# Patient Record
Sex: FEMALE | Race: BLACK OR AFRICAN AMERICAN | Employment: FULL TIME | URBAN - METROPOLITAN AREA
[De-identification: names, ages, dates, MRNs, and addresses within clinical notes are randomized per-mention and may not be internally consistent; named-entity substitution may affect disease eponyms.]

---

## 2019-08-18 ENCOUNTER — HOSPITAL ENCOUNTER (EMERGENCY)
Age: 44
Discharge: HOME | End: 2019-08-18
Attending: EMERGENCY MEDICINE | Admitting: EMERGENCY MEDICINE
Payer: COMMERCIAL

## 2019-08-18 ENCOUNTER — APPOINTMENT (OUTPATIENT)
Dept: GENERAL RADIOLOGY | Age: 44
End: 2019-08-18
Attending: PHYSICIAN ASSISTANT
Payer: COMMERCIAL

## 2019-08-18 VITALS
WEIGHT: 266.98 LBS | HEART RATE: 95 BPM | BODY MASS INDEX: 50.41 KG/M2 | TEMPERATURE: 98.4 F | RESPIRATION RATE: 15 BRPM | SYSTOLIC BLOOD PRESSURE: 146 MMHG | HEIGHT: 61 IN | OXYGEN SATURATION: 97 % | DIASTOLIC BLOOD PRESSURE: 97 MMHG

## 2019-08-18 DIAGNOSIS — S83.91XA SPRAIN OF RIGHT KNEE, UNSPECIFIED LIGAMENT, INITIAL ENCOUNTER: Primary | ICD-10-CM

## 2019-08-18 PROCEDURE — 96372 THER/PROPH/DIAG INJ SC/IM: CPT

## 2019-08-18 PROCEDURE — 74011250636 HC RX REV CODE- 250/636: Performed by: PHYSICIAN ASSISTANT

## 2019-08-18 PROCEDURE — 99282 EMERGENCY DEPT VISIT SF MDM: CPT

## 2019-08-18 PROCEDURE — 73562 X-RAY EXAM OF KNEE 3: CPT

## 2019-08-18 RX ORDER — METFORMIN HYDROCHLORIDE 500 MG/1
500 TABLET ORAL
COMMUNITY

## 2019-08-18 RX ORDER — KETOROLAC TROMETHAMINE 30 MG/ML
30 INJECTION, SOLUTION INTRAMUSCULAR; INTRAVENOUS
Status: COMPLETED | OUTPATIENT
Start: 2019-08-18 | End: 2019-08-18

## 2019-08-18 RX ORDER — KETOROLAC TROMETHAMINE 10 MG/1
10 TABLET, FILM COATED ORAL
Qty: 20 TAB | Refills: 0 | Status: SHIPPED | OUTPATIENT
Start: 2019-08-18 | End: 2020-11-28

## 2019-08-18 RX ADMIN — KETOROLAC TROMETHAMINE 30 MG: 30 INJECTION, SOLUTION INTRAMUSCULAR at 21:34

## 2019-08-19 NOTE — ED PROVIDER NOTES
EMERGENCY DEPARTMENT HISTORY AND PHYSICAL EXAM      Date: 8/18/2019  Patient Name: Héctor Costa    History of Presenting Illness     Chief Complaint   Patient presents with    Knee Pain     Right knee pain after a fall playing basketball. Able to bear weight       History Provided By: Patient    HPI: Héctor Costa, 40 y.o. female with PMHx significant for prediabetes, obesity, presents to the ED with cc of right knee injury this evening. She was playing basketball when she jumped into the air and another player hit her, causing her to twist her right knee as she landed. Since then, she has had pain to the lateral and posterior knee. Pain is worse with bending and weightbearing but she has been able to bear weight. She took Tylenol prior to coming in without relief of pain. She denies other injury. There are no other complaints, changes, or physical findings at this time. PCP: Kvng Boland MD    No current facility-administered medications on file prior to encounter. Current Outpatient Medications on File Prior to Encounter   Medication Sig Dispense Refill    metFORMIN (GLUCOPHAGE) 500 mg tablet Take 500 mg by mouth two (2) times daily (with meals). Past History     Past Medical History:  Past Medical History:   Diagnosis Date    Diabetes ([address]     pre-diabetic    Obesity        Past Surgical History:  Past Surgical History:   Procedure Laterality Date    HX GYN      fibroid removed about 9 yrs.  ago       Family History:  Family History   Problem Relation Age of Onset    Hypertension Mother     Lung Disease Father         copd    Hypertension Father     Anesth Problems Neg Hx        Social History:  Social History     Tobacco Use    Smoking status: Never Smoker    Smokeless tobacco: Never Used   Substance Use Topics    Alcohol use: Yes     Comment: occasionally    Drug use: No       Allergies:  No Known Allergies      Review of Systems   Review of Systems   Constitutional: Negative for chills and fever. HENT: Negative for ear pain and sore throat. Eyes: Negative for redness and visual disturbance. Respiratory: Negative for cough and shortness of breath. Cardiovascular: Negative for chest pain and palpitations. Gastrointestinal: Negative for abdominal pain, nausea and vomiting. Genitourinary: Negative for dysuria and hematuria. Musculoskeletal: Negative for back pain and gait problem. +right knee pain   Skin: Negative for rash and wound. Neurological: Negative for dizziness and headaches. Psychiatric/Behavioral: Negative for behavioral problems and confusion. All other systems reviewed and are negative. Physical Exam   Physical Exam   Constitutional: She is oriented to person, place, and time. She appears well-developed and well-nourished. Interactive female in no apparent distress. HENT:   Head: Normocephalic and atraumatic. Eyes: Pupils are equal, round, and reactive to light. Conjunctivae and EOM are normal.   Neck: Normal range of motion. Neck supple. Cardiovascular: Normal rate, regular rhythm and normal heart sounds. Pulmonary/Chest: Effort normal and breath sounds normal.   Musculoskeletal: Normal range of motion. Right knee: She exhibits normal range of motion, no swelling, no effusion, normal alignment, no LCL laxity and no MCL laxity. Tenderness (posterior) found. Lateral joint line tenderness noted. Neurological: She is alert and oriented to person, place, and time. She has normal strength. No cranial nerve deficit or sensory deficit. GCS eye subscore is 4. GCS verbal subscore is 5. GCS motor subscore is 6. Skin: Skin is warm and dry. No rash noted. Psychiatric: She has a normal mood and affect. Her behavior is normal.   Nursing note and vitals reviewed. Diagnostic Study Results     Labs -   No results found for this or any previous visit (from the past 12 hour(s)).     Radiologic Studies -   XR KNEE RT 3 V Final Result   IMPRESSION: No acute abnormality. CT Results  (Last 48 hours)    None        CXR Results  (Last 48 hours)    None            Medical Decision Making   I am the first provider for this patient. I reviewed the vital signs, available nursing notes, past medical history, past surgical history, family history and social history. Vital Signs-Reviewed the patient's vital signs. Patient Vitals for the past 12 hrs:   Temp Pulse Resp BP SpO2   08/18/19 2033 98.4 °F (36.9 °C) 95 15 (!) 146/97 97 %         Records Reviewed: Nursing Notes and Old Medical Records    Provider Notes (Medical Decision Making):   Pt presents with right knee injury. DDx includes fracture, dislocation, sprain, contusion. ED Course:   Initial assessment performed. The patients presenting problems have been discussed, and they are in agreement with the care plan formulated and outlined with them. I have encouraged them to ask questions as they arise throughout their visit. Disposition:  10:11 PM    The patient has been re-evaluated and is ready for discharge. Reviewed available results with patient. Counseled patient on diagnosis and care plan. Patient has expressed understanding, and all questions have been answered. Patient agrees with plan and agrees to follow up as recommended, or to return to the ED if their symptoms worsen. Discharge instructions have been provided and explained to the patient, along with reasons to return to the ED. PLAN:  1. Discharge Medication List as of 8/18/2019 10:12 PM      START taking these medications    Details   ketorolac (TORADOL) 10 mg tablet Take 1 Tab by mouth every six (6) hours as needed for Pain., Normal, Disp-20 Tab, R-0         CONTINUE these medications which have NOT CHANGED    Details   metFORMIN (GLUCOPHAGE) 500 mg tablet Take 500 mg by mouth two (2) times daily (with meals). , Historical Med           2.    Follow-up Information     Follow up With Specialties Details Why Contact Info    [facility]  Call in 1 day to schedule a follow up appointment [address] [place] [address]  [address] [facility],[address]  [address]   [address] [address]  [phone]    [facility] EMERGENCY DEPT Emergency Medicine Go to If symptoms worsen [address] [place] [address]  [phone]        Return to ED if worse     Diagnosis     Clinical Impression:   1. Sprain of right knee, unspecified ligament, initial encounter            Nancy Sharp. CHAVA Bosch        This note will not be viewable in [employer].

## 2019-08-19 NOTE — DISCHARGE INSTRUCTIONS
Patient Education        Knee Sprain: Care Instructions  Your Care Instructions    A knee sprain is one or more stretched, partly torn, or completely torn knee ligaments. Ligaments are bands of ropelike tissue that connect bone to bone and make the knee stable. The knee has four main ligaments. Knee sprains often happen because of a twisting or bending injury from sports such as skiing, basketball, soccer, or football. The knee turns one way while the lower or upper leg goes another way. A sprain also can happen when the knee is hit from the side or the front. If a knee ligament is slightly stretched, you will probably need only home treatment. You may need a splint or brace (immobilizer) for a partly torn ligament. A complete tear may need surgery. A minor knee sprain may take up to 6 weeks to heal, while a severe sprain may take months. Follow-up care is a key part of your treatment and safety. Be sure to make and go to all appointments, and call your doctor if you are having problems. It's also a good idea to know your test results and keep a list of the medicines you take. How can you care for yourself at home? · Follow instructions about how much weight you can put on your leg and how to walk with crutches. · Prop up your leg on a pillow when you ice it or anytime you sit or lie down for the next 3 days. Try to keep it above the level of your heart. This will help reduce swelling. · Put ice or a cold pack on your knee for 10 to 20 minutes at a time. Try to do this every 1 to 2 hours for the next 3 days (when you are awake) or until the swelling goes down. Put a thin cloth between the ice and your skin. Do not get the splint wet. · If you have an elastic bandage, make sure it is snug but not so tight that your leg is numb, tingles, or swells below the bandage. You can loosen the bandage if it is too tight. · Your doctor may recommend a brace (immobilizer) to support your knee while it heals.  Wear it as directed. · Ask your doctor if you can take an over-the-counter pain medicine, such as acetaminophen (Tylenol), ibuprofen (Advil, Motrin), or naproxen (Aleve). Be safe with medicines. Read and follow all instructions on the label. When should you call for help? Call 911 anytime you think you may need emergency care. For example, call if:    · You have sudden chest pain and shortness of breath, or you cough up blood.    Call your doctor now or seek immediate medical care if:    · You have increased or severe pain.     · You cannot move your toes or ankle.     · Your foot is cool or pale or changes color.     · You have tingling, weakness, or numbness in your foot or leg.     · Your splint or brace feels too tight.     · You are unable to straighten the knee, or the knee \"locks. \"     · You have signs of a blood clot in your leg, such as:  ? Pain in your calf, back of the knee, thigh, or groin. ? Redness and swelling in your leg.    Watch closely for changes in your health, and be sure to contact your doctor if:    · Your pain is not getting better or is getting worse. Where can you learn more? Go to http://anna-kaye.info/. Enter N406 in the search box to learn more about \"Knee Sprain: Care Instructions. \"  Current as of: September 20, 2018  Content Version: 12.1  © 6931-5558 [employer], [employer]. Care instructions adapted under license by [employer] (which disclaims liability or warranty for this information). If you have questions about a medical condition or this instruction, always ask your healthcare professional. [address] any warranty or liability for your use of this information.

## 2019-09-04 ENCOUNTER — HOSPITAL ENCOUNTER (OUTPATIENT)
Dept: MRI IMAGING | Age: 44
Discharge: HOME | End: 2019-09-04
Attending: ORTHOPAEDIC SURGERY
Payer: COMMERCIAL

## 2019-09-04 DIAGNOSIS — S83.241A ACUTE MEDIAL MENISCUS TEAR, RIGHT, INITIAL ENCOUNTER: ICD-10-CM

## 2019-09-04 PROCEDURE — 73721 MRI JNT OF LWR EXTRE W/O DYE: CPT

## 2020-11-28 ENCOUNTER — OFFICE VISIT (OUTPATIENT)
Dept: URGENT CARE | Age: 45
End: 2020-11-28
Payer: COMMERCIAL

## 2020-11-28 VITALS — HEART RATE: 68 BPM | TEMPERATURE: 98.4 F | RESPIRATION RATE: 16 BRPM | OXYGEN SATURATION: 97 %

## 2020-11-28 DIAGNOSIS — Z20.822 ENCOUNTER FOR LABORATORY TESTING FOR COVID-19 VIRUS: Primary | ICD-10-CM

## 2020-11-28 PROCEDURE — 99202 OFFICE O/P NEW SF 15 MIN: CPT | Performed by: NURSE PRACTITIONER

## 2020-11-28 NOTE — PROGRESS NOTES
Patient presents with a request for COVID Testing. She was exposed to a COVID positive person. She is asymptomatic. This patient was seen in [facility] at [address] [place] [address] Urgent Care while in their vehicle due to COVID-19 pandemic with PPE and focused examination in order to decrease community viral transmission. The patient/guardian gave verbal consent to treat. The history is provided by the patient. Past Medical History:   Diagnosis Date    Diabetes ([facility] [address]     pre-diabetic    Obesity         Past Surgical History:   Procedure Laterality Date    HX GYN      fibroid removed about 9 yrs.  ago         Family History   Problem Relation Age of Onset    Hypertension Mother     Lung Disease Father         copd    Hypertension Father     Anesth Problems Neg Hx         Social History     Socioeconomic History    Marital status:      Spouse name: Not on file    Number of children: Not on file    Years of education: Not on file    Highest education level: Not on file   Occupational History    Not on file   Social Needs    Financial resource strain: Not on file    Food insecurity     Worry: Not on file     Inability: Not on file    Transportation needs     Medical: Not on file     Non-medical: Not on file   Tobacco Use    Smoking status: Never Smoker    Smokeless tobacco: Never Used   Substance and Sexual Activity    Alcohol use: Yes     Comment: occasionally    Drug use: No    Sexual activity: Yes     Partners: Male   Lifestyle    Physical activity     Days per week: Not on file     Minutes per session: Not on file    Stress: Not on file   Relationships    Social connections     Talks on phone: Not on file     Gets together: Not on file     Attends Rastafarian service: Not on file     Active member of club or organization: Not on file     Attends meetings of clubs or organizations: Not on file     Relationship status: Not on file    Intimate partner violence     Fear of current or ex partner: Not on file     Emotionally abused: Not on file     Physically abused: Not on file     Forced sexual activity: Not on file   Other Topics Concern    Not on file   Social History Narrative    Not on file                ALLERGIES: Patient has no known allergies. Review of Systems   Constitutional: Negative for activity change, appetite change, chills, fatigue and fever. HENT: Negative for congestion, rhinorrhea, sinus pressure and sinus pain. Respiratory: Negative for cough, chest tightness and shortness of breath. Cardiovascular: Negative for chest pain. Gastrointestinal: Negative for diarrhea, nausea and vomiting. Vitals:    11/28/20 1703   Pulse: 68   Resp: 16   Temp: 98.4 °F (36.9 °C)   SpO2: 97%       Physical Exam  Constitutional:       General: She is not in acute distress. Appearance: She is well-developed. She is not ill-appearing. HENT:      Head: Normocephalic. Right Ear: No drainage. Tympanic membrane is not erythematous or bulging. Left Ear: No drainage. Tympanic membrane is not erythematous or bulging. Nose: Nose normal. No congestion or rhinorrhea. Cardiovascular:      Rate and Rhythm: Normal rate. Pulmonary:      Effort: Pulmonary effort is normal. No respiratory distress. Breath sounds: No decreased breath sounds. Neurological:      Mental Status: She is alert. MDM    ICD-10-CM ICD-9-CM    1. Encounter for laboratory testing for COVID-19 virus  Z20.828 V01.79 NOVEL CORONAVIRUS (COVID-19)     No orders of the defined types were placed in this encounter. No results found for any visits on 11/28/20. The patients condition was discussed with the patient and they understand. The patient is to follow up with primary care doctor. If signs and symptoms become worse the pt is to go to the ER. The patient is to take medications as prescribed.      Procedures

## 2020-12-02 LAB — SARS-COV-2, NAA: NOT DETECTED

## 2022-05-16 ENCOUNTER — OFFICE VISIT (OUTPATIENT)
Dept: ORTHOPEDIC SURGERY | Age: 47
End: 2022-05-16
Payer: COMMERCIAL

## 2022-05-16 VITALS — HEIGHT: 61 IN | BODY MASS INDEX: 51.92 KG/M2 | WEIGHT: 275 LBS

## 2022-05-16 DIAGNOSIS — M17.12 PRIMARY OSTEOARTHRITIS OF LEFT KNEE: Primary | ICD-10-CM

## 2022-05-16 PROCEDURE — 99214 OFFICE O/P EST MOD 30 MIN: CPT

## 2022-05-16 PROCEDURE — 20610 DRAIN/INJ JOINT/BURSA W/O US: CPT

## 2022-05-16 RX ORDER — DICLOFENAC SODIUM 75 MG/1
75 TABLET, DELAYED RELEASE ORAL 2 TIMES DAILY
Qty: 60 TABLET | Refills: 1 | Status: SHIPPED | OUTPATIENT
Start: 2022-05-16

## 2022-05-16 RX ORDER — TRIAMCINOLONE ACETONIDE 40 MG/ML
40 INJECTION, SUSPENSION INTRA-ARTICULAR; INTRAMUSCULAR ONCE
Status: COMPLETED | OUTPATIENT
Start: 2022-05-16 | End: 2022-05-16

## 2022-05-16 RX ORDER — BUPIVACAINE HYDROCHLORIDE 5 MG/ML
5 INJECTION, SOLUTION PERINEURAL ONCE
Status: COMPLETED | OUTPATIENT
Start: 2022-05-16 | End: 2022-05-16

## 2022-05-16 RX ADMIN — TRIAMCINOLONE ACETONIDE 40 MG: 40 INJECTION, SUSPENSION INTRA-ARTICULAR; INTRAMUSCULAR at 09:20

## 2022-05-16 RX ADMIN — BUPIVACAINE HYDROCHLORIDE 25 MG: 5 INJECTION, SOLUTION PERINEURAL at 09:20

## 2022-05-16 NOTE — PROGRESS NOTES
Ebenezer Figueroa (: 1975) is a 52 y.o. female, patient, here for evaluation of the following chief complaint(s):  Knee Pain (left)       SUBJECTIVE/OBJECTIVE:  Ebenezer Figueroa presents today complaining of worsening left knee pain. To review, we saw her over a year ago. Prior conservative treatment of left knee osteoarthritis, worsening recently. She has an upcoming trip. Pain located medially without radiation. Worse with increased activity. No new injury or trauma. PHYSICAL EXAM:  Vitals: Ht 5' 1\" (1.549 m)   Wt 275 lb (124.7 kg)   LMP 2015   BMI 51.96 kg/m²   Body mass index is 51.96 kg/m². 52y.o. year old F in no acute distress. Morbidly obese, but otherwise appears well. Negative Stinchfield on the left. Varus alignment of the left knee. Tenderness to palpation along the medial joint line. Range of motion 0 110/115 degrees. No instability. Motor 5/5. No distal edema. IMAGING:  Radiographs: XR Results (most recent):  Results from Appointment encounter on 22    XR KNEE LT MIN 4 V    Narrative  Four views (AP, PA-flex, lateral & sunrise) of the left knee were taken and reviewed today using digital radiography which reveal complete loss of medial joint space. Severe patellofemoral osteoarthritis. ASSESSMENT/PLAN:  1. Primary osteoarthritis of left knee  -     XR KNEE LT MIN 4 V; Future  -     diclofenac EC (VOLTAREN) 75 mg EC tablet; Take 1 Tablet by mouth two (2) times a day., Normal, Disp-60 Tablet, R-1  -     bupivacaine HCl (MARCAINE) 0.5 % (5 mg/mL) injection 25 mg; 25 mg (5 mL), Other, ONCE, 1 dose, On Mon 22 at 1000  -     triamcinolone acetonide (KENALOG-40) 40 mg/mL injection 40 mg; 40 mg, Intra artICUlar, ONCE, 1 dose, On 22 at 1000  2. BMI 50.0-59.9, [facility])    The xray and exam findings were discussed with the patient today. Acute exacerbation of severe left knee osteoarthritis.   Discussed risks/benefits of conservative vs. operative interventions at this time to include NSAIDs, PT, cortisone injections, and surgery. She takes Aleve with moderate relief, but for complete relief we will try diclofenac 75 mg twice a day as needed. I sent this to her pharmacy. In addition, she elects to proceed with cortisone injection today in preparation for upcoming trip to [place]. Discussed natural disease progression and likely need for total knee arthroplasty. However, treatment options are limited by BMI. She understands she has to lose weight prior to becoming an optimize surgical candidate. Follow-up as needed. Discussed risks/benefits of cortisone injection and patient gave verbal consent. Under sterile conditions, the left knee was injected with 5cc 0.5% Bupivacaine and 1cc 40mg Triamcinolone Acetonide (Kenalog) intra-articularly, tolerated the procedure well. Return if symptoms worsen or fail to improve. Review Of Systems  ROS     Positive for: Musculoskeletal    Last edited by Aleyda Springer RN on 5/16/2022  8:53 AM. (History)         Patient denies any recent fever, chills, nausea, vomiting, chest pain, or shortness of breath. No Known Allergies    Current Outpatient Medications   Medication Sig    diclofenac EC (VOLTAREN) 75 mg EC tablet Take 1 Tablet by mouth two (2) times a day.  metFORMIN (GLUCOPHAGE) 500 mg tablet Take 500 mg by mouth three (3) times daily (with meals). No current facility-administered medications for this visit. Past Medical History:   Diagnosis Date    Diabetes ([address]     pre-diabetic    Obesity         Past Surgical History:   Procedure Laterality Date    HX GYN      fibroid removed about 9 yrs.  ago       Family History   Problem Relation Age of Onset    Hypertension Mother     Lung Disease Father         copd    Hypertension Father     Anesth Problems Neg Hx         Social History     Socioeconomic History    Marital status:      Spouse name: Not on file    Number of children: Not on file    Years of education: Not on file    Highest education level: Not on file   Occupational History    Not on file   Tobacco Use    Smoking status: Never Smoker    Smokeless tobacco: Never Used   Substance and Sexual Activity    Alcohol use: Yes     Comment: occasionally    Drug use: No    Sexual activity: Yes     Partners: Male   Other Topics Concern    Not on file   Social History Narrative    Not on file     Social Determinants of Health     Financial Resource Strain:     Difficulty of Paying Living Expenses: Not on file   Food Insecurity:     Worried About Running Out of Food in the Last Year: Not on file    Justo of Food in the Last Year: Not on file   Transportation Needs:     Lack of Transportation (Medical): Not on file    Lack of Transportation (Non-Medical): Not on file   Physical Activity:     Days of Exercise per Week: Not on file    Minutes of Exercise per Session: Not on file   Stress:     Feeling of Stress : Not on file   Social Connections:     Frequency of Communication with Friends and Family: Not on file    Frequency of Social Gatherings with Friends and Family: Not on file    Attends Sikhism Services: Not on file    Active Member of [address] [place] [address] or Organizations: Not on file    Attends Club or Organization Meetings: Not on file    Marital Status: Not on file   Intimate Partner Violence:     Fear of Current or Ex-Partner: Not on file    Emotionally Abused: Not on file    Physically Abused: Not on file    Sexually Abused: Not on file   Housing Stability:     Unable to Pay for Housing in the Last Year: Not on file    Number of [place] in the Last Year: Not on file    Unstable Housing in the Last Year: Not on file       Orders Placed This Encounter    XR KNEE LT MIN 4 V     Standing Status:   Future     Number of Occurrences:   1     Standing Expiration Date:   5/17/2023     Order Specific Question:   Is Patient Pregnant?      Answer: No    diclofenac EC (VOLTAREN) 75 mg EC tablet     Sig: Take 1 Tablet by mouth two (2) times a day. Dispense:  60 Tablet     Refill:  1    bupivacaine HCl (MARCAINE) 0.5 % (5 mg/mL) injection 25 mg    triamcinolone acetonide (KENALOG-40) 40 mg/mL injection 40 mg      Daniel Muñiz M.D. was available for immediate consultation as the supervising physician. An electronic signature was used to authenticate this note.   -- Sandra Coburn PA-C

## 2022-05-16 NOTE — LETTER
5/16/2022    Patient: Arminda Suárez   YOB: 1975   Date of Visit: 5/16/2022     Wendy Dougherty MD  [facility] [phone]  Via Fax: [phone]    Dear Wendy Dougherty MD,      Thank you for referring Ms. Arminda Suárez to [facility] for evaluation. My notes for this consultation are attached. If you have questions, please do not hesitate to call me. I look forward to following your patient along with you.       Sincerely,    Alyson Emmanuel PA-C

## 2023-02-27 ENCOUNTER — OFFICE VISIT (OUTPATIENT)
Dept: ORTHOPEDIC SURGERY | Age: 48
End: 2023-02-27
Payer: COMMERCIAL

## 2023-02-27 VITALS — WEIGHT: 270 LBS | HEIGHT: 61 IN | BODY MASS INDEX: 50.98 KG/M2

## 2023-02-27 DIAGNOSIS — M17.12 PRIMARY OSTEOARTHRITIS OF LEFT KNEE: Primary | ICD-10-CM

## 2023-02-27 RX ORDER — BUPIVACAINE HYDROCHLORIDE 5 MG/ML
5 INJECTION, SOLUTION PERINEURAL ONCE
Status: COMPLETED | OUTPATIENT
Start: 2023-02-27 | End: 2023-02-27

## 2023-02-27 RX ORDER — TRIAMCINOLONE ACETONIDE 40 MG/ML
40 INJECTION, SUSPENSION INTRA-ARTICULAR; INTRAMUSCULAR ONCE
Status: COMPLETED | OUTPATIENT
Start: 2023-02-27 | End: 2023-02-27

## 2023-02-27 RX ADMIN — TRIAMCINOLONE ACETONIDE 40 MG: 40 INJECTION, SUSPENSION INTRA-ARTICULAR; INTRAMUSCULAR at 08:43

## 2023-02-27 RX ADMIN — BUPIVACAINE HYDROCHLORIDE 25 MG: 5 INJECTION, SOLUTION PERINEURAL at 08:42

## 2023-02-27 NOTE — PROGRESS NOTES
Chandler Banda (: 1975) is a 50 y.o. female, patient, here for evaluation of the following chief complaint(s):  Knee Pain (Left )       SUBJECTIVE/OBJECTIVE:  Chandler Banda presents today complaining of left knee pain. Knee pain began in October when she started working out, doing small group fitness classes at [employer]. Pain is diffuse in the knee, primarily medial without radiation. PHYSICAL EXAM:  Vitals: Ht 5' 1\" (1.549 m)   Wt 270 lb (122.5 kg)   LMP 2015   BMI 51.02 kg/m²   Body mass index is 51.02 kg/m². 50y.o. year old F in no acute distress. Morbidly obese. Varus alignment of the left knee, medial and lateral joint line tenderness palpation. Range of motion 0-1 05/110. Motor 5/5. No distal edema. IMAGING:  Radiographs: Prior x-rays reviewed, complete loss of medial joint space. Severe patellofemoral osteoarthritis. ASSESSMENT/PLAN:  1. Primary osteoarthritis of left knee  -     bupivacaine HCl (MARCAINE) 0.5 % (5 mg/mL) injection 25 mg; 25 mg (5 mL), Other, ONCE, 1 dose, On 23 at 0900  -     triamcinolone acetonide (KENALOG-40) 40 mg/mL injection 40 mg; 40 mg, Intra artICUlar, ONCE, 1 dose, On 23 at 0900  2. BMI 50.0-59.9, adult [facility])    The xray and exam findings were discussed with the patient today. Acute exacerbation of severe left knee osteoarthritis in the setting of morbid obesity. Her treatment options are limited until she loses weight. She has been working out at [employer] and attempt to lose weight, hoping to do a knee replacement sooner rather than later. In the interim, she elects to do a cortisone injection today. Over-the-counter anti-inflammatories and/or Tylenol as needed for pain relief. Continue low impact activity as tolerated. Follow-up as needed. Discussed risks/benefits of cortisone injection and patient gave verbal consent.   Under sterile conditions, the left knee was injected with 5cc 0.5% Bupivacaine and 1cc 40mg Triamcinolone Acetonide (Kenalog) intra-articularly, tolerated the procedure well. Return if symptoms worsen or fail to improve. Review Of Systems  ROS    Positive for: Musculoskeletal  Last edited by Eryn Fulton on 2/27/2023  8:40 AM.         Patient denies any recent fever, chills, nausea, vomiting, chest pain, or shortness of breath. No Known Allergies    Current Outpatient Medications   Medication Sig    metFORMIN (GLUCOPHAGE) 500 mg tablet Take 500 mg by mouth three (3) times daily (with meals). diclofenac EC (VOLTAREN) 75 mg EC tablet Take 1 Tablet by mouth two (2) times a day. (Patient not taking: Reported on 2/27/2023)     No current facility-administered medications for this visit. Past Medical History:   Diagnosis Date    Diabetes ([facility] [address]     pre-diabetic    Obesity         Past Surgical History:   Procedure Laterality Date    HX GYN      fibroid removed about 9 yrs.  ago       Family History   Problem Relation Age of Onset    Hypertension Mother     Lung Disease Father         copd    Hypertension Father     Anesth Problems Neg Hx         Social History     Socioeconomic History    Marital status:      Spouse name: Not on file    Number of children: Not on file    Years of education: Not on file    Highest education level: Not on file   Occupational History    Not on file   Tobacco Use    Smoking status: Never    Smokeless tobacco: Never   Substance and Sexual Activity    Alcohol use: Yes     Comment: occasionally    Drug use: No    Sexual activity: Yes     Partners: Male   Other Topics Concern    Not on file   Social History Narrative    Not on file     Social Determinants of Health     Financial Resource Strain: Not on file   Food Insecurity: Not on file   Transportation Needs: Not on file   Physical Activity: Not on file   Stress: Not on file   Social Connections: Not on file   Intimate Partner Violence: Not on file   Housing Stability: Not on file       Orders Placed This Encounter    bupivacaine HCl (MARCAINE) 0.5 % (5 mg/mL) injection 25 mg    triamcinolone acetonide (KENALOG-40) 40 mg/mL injection 40 mg      Daniel Cheung M.D. was available for immediate consultation as the supervising physician. An electronic signature was used to authenticate this note.   -- Lucina Salazar PA-C

## 2023-02-27 NOTE — LETTER
2/27/2023    Patient: Erin Stone   YOB: 1975   Date of Visit: 2/27/2023     Claribel Ardon, [facility] [facility],[address], [place] [id number]  Via Fax: [phone]    Dear Claribel Ardon MD,      Thank you for referring Ms. Erin Stone to [facility] for evaluation. My notes for this consultation are attached. If you have questions, please do not hesitate to call me. I look forward to following your patient along with you.       Sincerely,    Lawanda Delgadillo PA-C

## 2025-06-17 PROBLEM — M17.12 OSTEOARTHRITIS OF LEFT KNEE: Status: ACTIVE | Noted: 2025-06-17

## 2025-06-26 ENCOUNTER — HOSPITAL ENCOUNTER (OUTPATIENT)
Facility: HOSPITAL | Age: 50
Discharge: HOME | End: 2025-06-29
Payer: COMMERCIAL

## 2025-06-26 VITALS
SYSTOLIC BLOOD PRESSURE: 122 MMHG | HEIGHT: 61 IN | TEMPERATURE: 97.7 F | RESPIRATION RATE: 16 BRPM | HEART RATE: 59 BPM | WEIGHT: 247.2 LBS | DIASTOLIC BLOOD PRESSURE: 84 MMHG | BODY MASS INDEX: 46.67 KG/M2

## 2025-06-26 LAB
ABO + RH BLD: NORMAL
APPEARANCE UR: CLEAR
BACTERIA URNS QL MICRO: NEGATIVE /HPF
BILIRUB UR QL: NEGATIVE
BLOOD GROUP ANTIBODIES SERPL: NORMAL
COLOR UR: ABNORMAL
EKG ATRIAL RATE: 56 BPM
EKG DIAGNOSIS: NORMAL
EKG P AXIS: 29 DEGREES
EKG P-R INTERVAL: 152 MS
EKG Q-T INTERVAL: 408 MS
EKG QRS DURATION: 88 MS
EKG QTC CALCULATION (BAZETT): 393 MS
EKG R AXIS: 26 DEGREES
EKG T AXIS: 2 DEGREES
EKG VENTRICULAR RATE: 56 BPM
EPITH CASTS URNS QL MICRO: ABNORMAL /LPF
GLUCOSE UR STRIP.AUTO-MCNC: NEGATIVE MG/DL
HGB UR QL STRIP: NEGATIVE
HYALINE CASTS URNS QL MICRO: ABNORMAL /LPF (ref 0–5)
INR PPP: 1 (ref 0.9–1.1)
KETONES UR QL STRIP.AUTO: NEGATIVE MG/DL
LEUKOCYTE ESTERASE UR QL STRIP.AUTO: ABNORMAL
NITRITE UR QL STRIP.AUTO: NEGATIVE
PH UR STRIP: 5 (ref 5–8)
PROT UR STRIP-MCNC: NEGATIVE MG/DL
PROTHROMBIN TIME: 10.3 SEC (ref 9.2–11.2)
RBC #/AREA URNS HPF: ABNORMAL /HPF (ref 0–5)
SP GR UR REFRACTOMETRY: 1.01 (ref 1–1.03)
SPECIMEN EXP DATE BLD: NORMAL
URINE CULTURE IF INDICATED: ABNORMAL
UROBILINOGEN UR QL STRIP.AUTO: 0.2 EU/DL (ref 0.2–1)
WBC URNS QL MICRO: ABNORMAL /HPF (ref 0–4)

## 2025-06-26 PROCEDURE — 93010 ELECTROCARDIOGRAM REPORT: CPT | Performed by: SPECIALIST

## 2025-06-26 PROCEDURE — 86901 BLOOD TYPING SEROLOGIC RH(D): CPT

## 2025-06-26 PROCEDURE — 85610 PROTHROMBIN TIME: CPT

## 2025-06-26 PROCEDURE — 93005 ELECTROCARDIOGRAM TRACING: CPT | Performed by: ORTHOPAEDIC SURGERY

## 2025-06-26 PROCEDURE — 81001 URINALYSIS AUTO W/SCOPE: CPT

## 2025-06-26 PROCEDURE — 86850 RBC ANTIBODY SCREEN: CPT

## 2025-06-26 PROCEDURE — 86900 BLOOD TYPING SEROLOGIC ABO: CPT

## 2025-06-26 NOTE — PERIOP NOTE
[address] [address]     MAIN PRE OP            [phone]                                                                                AMBULATORY PRE OP          [phone]    PRE-ADMISSION TESTING    [phone]     Surgery Date:  7/17/25       [facility] staff will call you between 4 and 7pm the day before your surgery with your arrival time. (If your surgery is on a Monday, we will call you the Friday before.)    Call [phone] after 7pm Monday-Friday if you did not receive this call.    INSTRUCTIONS BEFORE YOUR SURGERY   When You  Arrive Arrive at [facility] Patient Access on 1st floor the day of your surgery.  Have your insurance card, photo ID,living will/advanced directive/POA (if applicable),  and any copayment (if needed)   Food   and   Drink NO solid food after midnight the night before surgery. You can drink clear liquids from midnight until ONE hour prior to your arrival at the hospital on the day of your surgery.     Clear liquids include:  Water  Apple juice (no sediment)  Carbonated beverages  Black coffee(no cream/milk)  Tea(no cream/milk)  Gatorade    No alcohol (beer, wine, liquor) or marijuana (smoking) 24 hours prior to surgery.   No marijuana edibles for 3 days prior to surgery.    Stop smoking cigarettes 14 days before surgery (helps w/healing and breathing).   Medications to   TAKE   Morning of Surgery MEDICATIONS TO TAKE THE MORNING OF SURGERY WITH A SIP OF WATER:   NONE    You may take these medications, IF NEEDED, the morning of surgery: N/A    Ask your surgeon/prescribing doctor for instructions on taking or stopping these medications prior to surgery: N/A   Medications to STOP  before surgery Non-Steroidal anti-inflammatory Drugs (NSAID's): for example, Diclofenac(Voltaren),  Ibuprofen (Advil, Motrin), Naproxen (Aleve) 3 days    STOP all herbal supplements and vitamins(unless prescribed by your doctor), and  diabetes, it is important for you to manage your blood sugar levels properly before your surgery as well as after your surgery. Poorly managed blood sugar levels slow down wound healing and prevent you from healing completely.        Testing for Staphylococcus aureus on your skin before surgery    Staphylococcus aureus (staph) is a common bacteria that is found on the body. It normally does not cause infection on healthy skin. Before surgery, you will be tested to see if you have staph by swabbing the inside of your nose. When you have an incision with surgery, the goal is to protect that incision from infection. Removal of the staph bacteria before surgery can decrease the risk of a surgical site infection.    If your nose swab is positive for staph you will be called. Your treatment will include 2 steps:  Prescription for Mupirocin ointment to be used in each nostril twice a day for 5 days.  Showering with Chlorhexidine (CHG) liquid soap for 5 days prior to surgery (follow same CHG Shower Instructions as above).    How to use Mupirocin ointment in your nose   the prescription from your pharmacy. You will receive a large tube of ointment which will be big enough for all of your treatments. You will apply this ointment to each nostril 2 times a day for 5 days.  Wash your hands with  gel or soap and water for 20 seconds before using ointment.  Place a pea-sized amount of ointment on a cotton Q-tip.  Apply ointment just inside of each nostril with the Q-tip. Do not push Q-tip or ointment deep inside you nose.  Press your nostrils together and massage for a few seconds.  Wash your hands with  gel or soap and water after you are finished.  Do not get ointment near your eyes. If it gets into your eyes, rinse them with cool water.  If you need to use nasal spray, clean the tip of the bottle with alcohol before use and do not use both at the same time.  If you are scheduled for COVID testing during

## 2025-06-26 NOTE — PERIOP NOTE
LUIS AND PROMIS SURVEY COMPLETED WITH IPAD JOE PATIENT.     Patient had PCP appt today, prior to coming to PAT.  I spoke with Anika at Dr. Quiroz's office and she will fax the following results to us once completed, fax number provided:    HgbA1C  CMP  CBC

## 2025-06-27 LAB
BACTERIA SPEC CULT: NORMAL
BACTERIA SPEC CULT: NORMAL
SERVICE CMNT-IMP: NORMAL

## 2025-06-29 NOTE — PERIOP NOTE
PAT Nurse Practitioner   Pre-Operative Chart Review/Assessment:-ORTHOPEDIC                Patient Name:  Brianna Harrison                                                           Age:   50 y.o.    :  1975     Today's Date:  2025     Date of PAT:   25      Date of Surgery:    25      Procedure(s):  Left Total Knee Arthroplasty     Anesthesia:   Spinal     Surgeon:   Dr. Rosas                       PLAN:      1)  PCP:  Bakari Quiroz MD      2)  Cardiac Clearance:  EKG and METs reviewed. No further cardiac evaluation requested. PAT EKG showed sinus bradley; HR-56.         3)  Diabetic Treatment Consult:  Not indicated. A1c-6.0      4)  Sleep Apnea evaluation:   Not indicated. KEVIN Score 2.       5) Treatment for MRSA/Staph Aureus:  Neg      6) Discharge Plan:  Home w/ spouse. FAST TRACK.      7) Skin: Denies open wounds, cuts, sores, rashes or other areas of concern in PAT assessment.      8) Additional Concerns:  PreDM, obesity      9) Cardiac/Diabetic Medication Recommendations Prior to Surgery:  hold metformin DOS      Additional Orders for Day of Surgery:  T&S      Records Scanned in Epic:   Labs              Vital Signs:        Vitals:    25 0815   BP: 122/84   Pulse: 59   Resp: 16   Temp: 97.7 °F (36.5 °C)             Body mass index is 46.71 kg/m².         ____________________________________________  PAST MEDICAL HISTORY  Past Medical History:   Diagnosis Date    Diabetes (HCC)     pre-diabetic    Obesity       ____________________________________________  PAST SURGICAL HISTORY  Past Surgical History:   Procedure Laterality Date    COLONOSCOPY      GYN      FIBROID REMOVED    HYSTERECTOMY (CERVIX STATUS UNKNOWN)      WISDOM TOOTH EXTRACTION        ____________________________________________  HOME MEDICATIONS  Current Outpatient Medications   Medication Sig    metFORMIN (GLUCOPHAGE) 500 MG tablet Take 1 tablet by mouth 3 times daily (with meals)     No current facility-administered

## 2025-07-16 ENCOUNTER — ANESTHESIA EVENT (OUTPATIENT)
Facility: HOSPITAL | Age: 50
End: 2025-07-16
Payer: COMMERCIAL

## 2025-07-17 ENCOUNTER — ANESTHESIA (OUTPATIENT)
Facility: HOSPITAL | Age: 50
End: 2025-07-17
Payer: COMMERCIAL

## 2025-07-17 ENCOUNTER — HOSPITAL ENCOUNTER (OUTPATIENT)
Facility: HOSPITAL | Age: 50
Discharge: HOME | End: 2025-07-17
Attending: ORTHOPAEDIC SURGERY | Admitting: ORTHOPAEDIC SURGERY
Payer: COMMERCIAL

## 2025-07-17 VITALS
TEMPERATURE: 97.6 F | OXYGEN SATURATION: 100 % | BODY MASS INDEX: 46.66 KG/M2 | SYSTOLIC BLOOD PRESSURE: 120 MMHG | DIASTOLIC BLOOD PRESSURE: 83 MMHG | HEIGHT: 61 IN | HEART RATE: 59 BPM | WEIGHT: 247.14 LBS | RESPIRATION RATE: 14 BRPM

## 2025-07-17 DIAGNOSIS — Z96.652 STATUS POST LEFT KNEE REPLACEMENT: Primary | ICD-10-CM

## 2025-07-17 PROBLEM — M17.12 PRIMARY OSTEOARTHRITIS OF LEFT KNEE: Status: ACTIVE | Noted: 2025-07-17

## 2025-07-17 LAB
ABO + RH BLD: NORMAL
BLOOD GROUP ANTIBODIES SERPL: NORMAL
GLUCOSE BLD STRIP.AUTO-MCNC: 108 MG/DL (ref 65–117)
SERVICE CMNT-IMP: NORMAL
SPECIMEN EXP DATE BLD: NORMAL

## 2025-07-17 PROCEDURE — 97530 THERAPEUTIC ACTIVITIES: CPT

## 2025-07-17 PROCEDURE — 2580000003 HC RX 258: Performed by: ANESTHESIOLOGY

## 2025-07-17 PROCEDURE — 2500000003 HC RX 250 WO HCPCS

## 2025-07-17 PROCEDURE — 6360000002 HC RX W HCPCS: Performed by: PHYSICIAN ASSISTANT

## 2025-07-17 PROCEDURE — 3700000000 HC ANESTHESIA ATTENDED CARE: Performed by: ORTHOPAEDIC SURGERY

## 2025-07-17 PROCEDURE — 6360000002 HC RX W HCPCS

## 2025-07-17 PROCEDURE — 2720000010 HC SURG SUPPLY STERILE: Performed by: ORTHOPAEDIC SURGERY

## 2025-07-17 PROCEDURE — 3600000014 HC SURGERY LEVEL 4 ADDTL 15MIN: Performed by: ORTHOPAEDIC SURGERY

## 2025-07-17 PROCEDURE — 7100000000 HC PACU RECOVERY - FIRST 15 MIN: Performed by: ORTHOPAEDIC SURGERY

## 2025-07-17 PROCEDURE — C1713 ANCHOR/SCREW BN/BN,TIS/BN: HCPCS | Performed by: ORTHOPAEDIC SURGERY

## 2025-07-17 PROCEDURE — 82962 GLUCOSE BLOOD TEST: CPT

## 2025-07-17 PROCEDURE — 86901 BLOOD TYPING SEROLOGIC RH(D): CPT

## 2025-07-17 PROCEDURE — 2500000003 HC RX 250 WO HCPCS: Performed by: PHYSICIAN ASSISTANT

## 2025-07-17 PROCEDURE — 6370000000 HC RX 637 (ALT 250 FOR IP): Performed by: PHYSICIAN ASSISTANT

## 2025-07-17 PROCEDURE — 97161 PT EVAL LOW COMPLEX 20 MIN: CPT

## 2025-07-17 PROCEDURE — 2580000003 HC RX 258: Performed by: PHYSICIAN ASSISTANT

## 2025-07-17 PROCEDURE — 6360000002 HC RX W HCPCS: Performed by: ANESTHESIOLOGY

## 2025-07-17 PROCEDURE — C1776 JOINT DEVICE (IMPLANTABLE): HCPCS | Performed by: ORTHOPAEDIC SURGERY

## 2025-07-17 PROCEDURE — 64447 NJX AA&/STRD FEMORAL NRV IMG: CPT | Performed by: ANESTHESIOLOGY

## 2025-07-17 PROCEDURE — 2709999900 HC NON-CHARGEABLE SUPPLY: Performed by: ORTHOPAEDIC SURGERY

## 2025-07-17 PROCEDURE — 6370000000 HC RX 637 (ALT 250 FOR IP): Performed by: ORTHOPAEDIC SURGERY

## 2025-07-17 PROCEDURE — 97116 GAIT TRAINING THERAPY: CPT

## 2025-07-17 PROCEDURE — 3600000004 HC SURGERY LEVEL 4 BASE: Performed by: ORTHOPAEDIC SURGERY

## 2025-07-17 PROCEDURE — 6370000000 HC RX 637 (ALT 250 FOR IP): Performed by: ANESTHESIOLOGY

## 2025-07-17 PROCEDURE — 7100000001 HC PACU RECOVERY - ADDTL 15 MIN: Performed by: ORTHOPAEDIC SURGERY

## 2025-07-17 PROCEDURE — 7100000011 HC PHASE II RECOVERY - ADDTL 15 MIN: Performed by: ORTHOPAEDIC SURGERY

## 2025-07-17 PROCEDURE — 86850 RBC ANTIBODY SCREEN: CPT

## 2025-07-17 PROCEDURE — 6360000002 HC RX W HCPCS: Performed by: NURSE ANESTHETIST, CERTIFIED REGISTERED

## 2025-07-17 PROCEDURE — 86900 BLOOD TYPING SEROLOGIC ABO: CPT

## 2025-07-17 PROCEDURE — 3700000001 HC ADD 15 MINUTES (ANESTHESIA): Performed by: ORTHOPAEDIC SURGERY

## 2025-07-17 PROCEDURE — 7100000010 HC PHASE II RECOVERY - FIRST 15 MIN: Performed by: ORTHOPAEDIC SURGERY

## 2025-07-17 DEVICE — EMPOWR 3D KNEETM INS, 5L 10MM, VE
Type: IMPLANTABLE DEVICE | Site: KNEE | Status: FUNCTIONAL
Brand: DJO SURGICAL

## 2025-07-17 DEVICE — EMPOWR 3D KNEETM, PRESS FIT FEMUR, 5L
Type: IMPLANTABLE DEVICE | Site: KNEE | Status: FUNCTIONAL
Brand: DJO SURGICAL

## 2025-07-17 DEVICE — IMPL CAPPED KNEE K2 TOTAL/HEMI ADV CEMENTLESS DJO: Type: IMPLANTABLE DEVICE | Site: KNEE | Status: FUNCTIONAL

## 2025-07-17 DEVICE — DJO EMPOWR KNEETM, PRESS FIT BP, 5L
Type: IMPLANTABLE DEVICE | Site: KNEE | Status: FUNCTIONAL
Brand: DJO SURGICAL

## 2025-07-17 RX ORDER — SODIUM CHLORIDE 9 MG/ML
INJECTION, SOLUTION INTRAVENOUS PRN
Status: DISCONTINUED | OUTPATIENT
Start: 2025-07-17 | End: 2025-07-17 | Stop reason: HOSPADM

## 2025-07-17 RX ORDER — FENTANYL CITRATE 50 UG/ML
25 INJECTION, SOLUTION INTRAMUSCULAR; INTRAVENOUS EVERY 5 MIN PRN
Status: COMPLETED | OUTPATIENT
Start: 2025-07-17 | End: 2025-07-17

## 2025-07-17 RX ORDER — MIDAZOLAM HYDROCHLORIDE 2 MG/2ML
2 INJECTION, SOLUTION INTRAMUSCULAR; INTRAVENOUS ONCE
Status: COMPLETED | OUTPATIENT
Start: 2025-07-17 | End: 2025-07-17

## 2025-07-17 RX ORDER — SODIUM CHLORIDE 0.9 % (FLUSH) 0.9 %
5-40 SYRINGE (ML) INJECTION PRN
Status: DISCONTINUED | OUTPATIENT
Start: 2025-07-17 | End: 2025-07-17 | Stop reason: HOSPADM

## 2025-07-17 RX ORDER — BISACODYL 10 MG
10 SUPPOSITORY, RECTAL RECTAL DAILY PRN
Status: CANCELLED | OUTPATIENT
Start: 2025-07-17

## 2025-07-17 RX ORDER — MIDAZOLAM HYDROCHLORIDE 1 MG/ML
INJECTION, SOLUTION INTRAMUSCULAR; INTRAVENOUS
Status: DISCONTINUED | OUTPATIENT
Start: 2025-07-17 | End: 2025-07-17 | Stop reason: SDUPTHER

## 2025-07-17 RX ORDER — SODIUM CHLORIDE 0.9 % (FLUSH) 0.9 %
5-40 SYRINGE (ML) INJECTION EVERY 12 HOURS SCHEDULED
Status: CANCELLED | OUTPATIENT
Start: 2025-07-17

## 2025-07-17 RX ORDER — MIDAZOLAM HYDROCHLORIDE 2 MG/2ML
2 INJECTION, SOLUTION INTRAMUSCULAR; INTRAVENOUS PRN
Status: DISCONTINUED | OUTPATIENT
Start: 2025-07-17 | End: 2025-07-17 | Stop reason: HOSPADM

## 2025-07-17 RX ORDER — HYDRALAZINE HYDROCHLORIDE 20 MG/ML
10 INJECTION INTRAMUSCULAR; INTRAVENOUS ONCE
Status: DISCONTINUED | OUTPATIENT
Start: 2025-07-17 | End: 2025-07-17 | Stop reason: HOSPADM

## 2025-07-17 RX ORDER — LIDOCAINE HYDROCHLORIDE 10 MG/ML
1 INJECTION, SOLUTION EPIDURAL; INFILTRATION; INTRACAUDAL; PERINEURAL
Status: DISCONTINUED | OUTPATIENT
Start: 2025-07-17 | End: 2025-07-17 | Stop reason: HOSPADM

## 2025-07-17 RX ORDER — OXYCODONE HYDROCHLORIDE 5 MG/1
2.5-5 TABLET ORAL EVERY 4 HOURS PRN
Qty: 42 TABLET | Refills: 0 | Status: SHIPPED | OUTPATIENT
Start: 2025-07-17 | End: 2025-07-24

## 2025-07-17 RX ORDER — SODIUM CHLORIDE 9 MG/ML
INJECTION, SOLUTION INTRAVENOUS PRN
Status: DISCONTINUED | OUTPATIENT
Start: 2025-07-17 | End: 2025-07-17 | Stop reason: SDUPTHER

## 2025-07-17 RX ORDER — HYDROMORPHONE HYDROCHLORIDE 1 MG/ML
0.5 INJECTION, SOLUTION INTRAMUSCULAR; INTRAVENOUS; SUBCUTANEOUS EVERY 5 MIN PRN
Status: COMPLETED | OUTPATIENT
Start: 2025-07-17 | End: 2025-07-17

## 2025-07-17 RX ORDER — PROPOFOL 10 MG/ML
INJECTION, EMULSION INTRAVENOUS
Status: DISCONTINUED | OUTPATIENT
Start: 2025-07-17 | End: 2025-07-17 | Stop reason: SDUPTHER

## 2025-07-17 RX ORDER — HYDROMORPHONE HYDROCHLORIDE 1 MG/ML
0.5 INJECTION, SOLUTION INTRAMUSCULAR; INTRAVENOUS; SUBCUTANEOUS EVERY 4 HOURS PRN
Refills: 0 | Status: CANCELLED | OUTPATIENT
Start: 2025-07-17

## 2025-07-17 RX ORDER — OXYCODONE HYDROCHLORIDE 5 MG/1
5 TABLET ORAL
Status: COMPLETED | OUTPATIENT
Start: 2025-07-17 | End: 2025-07-17

## 2025-07-17 RX ORDER — FENTANYL CITRATE 50 UG/ML
100 INJECTION, SOLUTION INTRAMUSCULAR; INTRAVENOUS ONCE
Refills: 0 | Status: COMPLETED | OUTPATIENT
Start: 2025-07-17 | End: 2025-07-17

## 2025-07-17 RX ORDER — LIDOCAINE HYDROCHLORIDE 20 MG/ML
INJECTION, SOLUTION EPIDURAL; INFILTRATION; INTRACAUDAL; PERINEURAL
Status: DISCONTINUED | OUTPATIENT
Start: 2025-07-17 | End: 2025-07-17 | Stop reason: SDUPTHER

## 2025-07-17 RX ORDER — FENTANYL CITRATE 50 UG/ML
INJECTION, SOLUTION INTRAMUSCULAR; INTRAVENOUS
Status: DISCONTINUED | OUTPATIENT
Start: 2025-07-17 | End: 2025-07-17 | Stop reason: SDUPTHER

## 2025-07-17 RX ORDER — HYDROXYZINE HYDROCHLORIDE 10 MG/1
10 TABLET, FILM COATED ORAL EVERY 8 HOURS PRN
Status: CANCELLED | OUTPATIENT
Start: 2025-07-17

## 2025-07-17 RX ORDER — SODIUM CHLORIDE 0.9 % (FLUSH) 0.9 %
5-40 SYRINGE (ML) INJECTION PRN
Status: CANCELLED | OUTPATIENT
Start: 2025-07-17

## 2025-07-17 RX ORDER — SODIUM CHLORIDE 0.9 % (FLUSH) 0.9 %
5-40 SYRINGE (ML) INJECTION EVERY 12 HOURS SCHEDULED
Status: DISCONTINUED | OUTPATIENT
Start: 2025-07-17 | End: 2025-07-17 | Stop reason: HOSPADM

## 2025-07-17 RX ORDER — ROPIVACAINE HYDROCHLORIDE 5 MG/ML
30 INJECTION, SOLUTION EPIDURAL; INFILTRATION; PERINEURAL ONCE
Status: DISCONTINUED | OUTPATIENT
Start: 2025-07-17 | End: 2025-07-17 | Stop reason: HOSPADM

## 2025-07-17 RX ORDER — ACETAMINOPHEN 500 MG
500 TABLET ORAL
Status: CANCELLED | OUTPATIENT
Start: 2025-07-17

## 2025-07-17 RX ORDER — ACETAMINOPHEN 500 MG
500 TABLET ORAL EVERY 4 HOURS
Qty: 100 TABLET | Refills: 0 | Status: SHIPPED | OUTPATIENT
Start: 2025-07-17

## 2025-07-17 RX ORDER — BUPIVACAINE HYDROCHLORIDE 7.5 MG/ML
INJECTION, SOLUTION INTRASPINAL
Status: COMPLETED | OUTPATIENT
Start: 2025-07-17 | End: 2025-07-17

## 2025-07-17 RX ORDER — MEPERIDINE HYDROCHLORIDE 25 MG/ML
INJECTION INTRAMUSCULAR; INTRAVENOUS; SUBCUTANEOUS
Status: DISCONTINUED | OUTPATIENT
Start: 2025-07-17 | End: 2025-07-17 | Stop reason: SDUPTHER

## 2025-07-17 RX ORDER — SODIUM CHLORIDE 9 MG/ML
INJECTION, SOLUTION INTRAVENOUS CONTINUOUS
Status: CANCELLED | OUTPATIENT
Start: 2025-07-17

## 2025-07-17 RX ORDER — ONDANSETRON 4 MG/1
4 TABLET, ORALLY DISINTEGRATING ORAL EVERY 8 HOURS PRN
Status: CANCELLED | OUTPATIENT
Start: 2025-07-17

## 2025-07-17 RX ORDER — CELECOXIB 200 MG/1
200 CAPSULE ORAL ONCE
Status: COMPLETED | OUTPATIENT
Start: 2025-07-17 | End: 2025-07-17

## 2025-07-17 RX ORDER — AMOXICILLIN 250 MG
1 CAPSULE ORAL 2 TIMES DAILY PRN
Qty: 60 TABLET | Refills: 0 | Status: SHIPPED | OUTPATIENT
Start: 2025-07-17

## 2025-07-17 RX ORDER — ONDANSETRON 2 MG/ML
4 INJECTION INTRAMUSCULAR; INTRAVENOUS EVERY 6 HOURS PRN
Status: CANCELLED | OUTPATIENT
Start: 2025-07-17

## 2025-07-17 RX ORDER — ROPIVACAINE HYDROCHLORIDE 5 MG/ML
INJECTION, SOLUTION EPIDURAL; INFILTRATION; PERINEURAL
Status: COMPLETED | OUTPATIENT
Start: 2025-07-17 | End: 2025-07-17

## 2025-07-17 RX ORDER — ONDANSETRON 2 MG/ML
4 INJECTION INTRAMUSCULAR; INTRAVENOUS
Status: COMPLETED | OUTPATIENT
Start: 2025-07-17 | End: 2025-07-17

## 2025-07-17 RX ORDER — DEXMEDETOMIDINE HYDROCHLORIDE 100 UG/ML
INJECTION, SOLUTION INTRAVENOUS
Status: DISCONTINUED | OUTPATIENT
Start: 2025-07-17 | End: 2025-07-17 | Stop reason: SDUPTHER

## 2025-07-17 RX ORDER — OXYCODONE HYDROCHLORIDE 5 MG/1
2.5 TABLET ORAL
Refills: 0 | Status: CANCELLED | OUTPATIENT
Start: 2025-07-17

## 2025-07-17 RX ORDER — SENNA AND DOCUSATE SODIUM 50; 8.6 MG/1; MG/1
1 TABLET, FILM COATED ORAL 2 TIMES DAILY
Status: CANCELLED | OUTPATIENT
Start: 2025-07-17

## 2025-07-17 RX ORDER — SODIUM CHLORIDE, SODIUM LACTATE, POTASSIUM CHLORIDE, CALCIUM CHLORIDE 600; 310; 30; 20 MG/100ML; MG/100ML; MG/100ML; MG/100ML
INJECTION, SOLUTION INTRAVENOUS CONTINUOUS
Status: DISCONTINUED | OUTPATIENT
Start: 2025-07-17 | End: 2025-07-17 | Stop reason: HOSPADM

## 2025-07-17 RX ORDER — 0.9 % SODIUM CHLORIDE 0.9 %
500 INTRAVENOUS SOLUTION INTRAVENOUS PRN
Status: CANCELLED | OUTPATIENT
Start: 2025-07-17

## 2025-07-17 RX ORDER — ACETAMINOPHEN 500 MG
1000 TABLET ORAL ONCE
Status: DISCONTINUED | OUTPATIENT
Start: 2025-07-17 | End: 2025-07-17 | Stop reason: SDUPTHER

## 2025-07-17 RX ORDER — SODIUM CHLORIDE 9 MG/ML
INJECTION, SOLUTION INTRAVENOUS PRN
Status: CANCELLED | OUTPATIENT
Start: 2025-07-17

## 2025-07-17 RX ORDER — FENTANYL CITRATE 50 UG/ML
100 INJECTION, SOLUTION INTRAMUSCULAR; INTRAVENOUS
Status: DISCONTINUED | OUTPATIENT
Start: 2025-07-17 | End: 2025-07-17 | Stop reason: HOSPADM

## 2025-07-17 RX ORDER — OXYCODONE HYDROCHLORIDE 5 MG/1
5 TABLET ORAL
Refills: 0 | Status: CANCELLED | OUTPATIENT
Start: 2025-07-17

## 2025-07-17 RX ORDER — ONDANSETRON 2 MG/ML
INJECTION INTRAMUSCULAR; INTRAVENOUS
Status: DISCONTINUED | OUTPATIENT
Start: 2025-07-17 | End: 2025-07-17 | Stop reason: SDUPTHER

## 2025-07-17 RX ORDER — KETOROLAC TROMETHAMINE 30 MG/ML
15 INJECTION, SOLUTION INTRAMUSCULAR; INTRAVENOUS EVERY 6 HOURS
Status: CANCELLED | OUTPATIENT
Start: 2025-07-17 | End: 2025-07-18

## 2025-07-17 RX ORDER — PROCHLORPERAZINE EDISYLATE 5 MG/ML
5 INJECTION INTRAMUSCULAR; INTRAVENOUS
Status: DISCONTINUED | OUTPATIENT
Start: 2025-07-17 | End: 2025-07-17 | Stop reason: HOSPADM

## 2025-07-17 RX ORDER — ACETAMINOPHEN 500 MG
1000 TABLET ORAL ONCE
Status: COMPLETED | OUTPATIENT
Start: 2025-07-17 | End: 2025-07-17

## 2025-07-17 RX ADMIN — ONDANSETRON 4 MG: 2 INJECTION, SOLUTION INTRAMUSCULAR; INTRAVENOUS at 09:38

## 2025-07-17 RX ADMIN — DEXMEDETOMIDINE 4 MCG: 100 INJECTION, SOLUTION INTRAVENOUS at 09:12

## 2025-07-17 RX ADMIN — FENTANYL CITRATE 50 MCG: 50 INJECTION INTRAMUSCULAR; INTRAVENOUS at 08:32

## 2025-07-17 RX ADMIN — FENTANYL CITRATE 25 MCG: 0.05 INJECTION, SOLUTION INTRAMUSCULAR; INTRAVENOUS at 10:30

## 2025-07-17 RX ADMIN — PROPOFOL 50 MG: 10 INJECTION, EMULSION INTRAVENOUS at 09:10

## 2025-07-17 RX ADMIN — PROPOFOL 25 MG: 10 INJECTION, EMULSION INTRAVENOUS at 08:56

## 2025-07-17 RX ADMIN — FENTANYL CITRATE 25 MCG: 0.05 INJECTION, SOLUTION INTRAMUSCULAR; INTRAVENOUS at 10:01

## 2025-07-17 RX ADMIN — HYDROMORPHONE HYDROCHLORIDE 0.5 MG: 1 INJECTION, SOLUTION INTRAMUSCULAR; INTRAVENOUS; SUBCUTANEOUS at 09:57

## 2025-07-17 RX ADMIN — WATER 2000 MG: 1 INJECTION INTRAMUSCULAR; INTRAVENOUS; SUBCUTANEOUS at 08:15

## 2025-07-17 RX ADMIN — ROPIVACAINE HYDROCHLORIDE 30 ML: 5 INJECTION EPIDURAL; INFILTRATION; PERINEURAL at 07:22

## 2025-07-17 RX ADMIN — DEXMEDETOMIDINE 4 MCG: 100 INJECTION, SOLUTION INTRAVENOUS at 09:15

## 2025-07-17 RX ADMIN — PROPOFOL 25 MG: 10 INJECTION, EMULSION INTRAVENOUS at 07:59

## 2025-07-17 RX ADMIN — PROPOFOL 25 MG: 10 INJECTION, EMULSION INTRAVENOUS at 08:02

## 2025-07-17 RX ADMIN — MIDAZOLAM 2 MG: 1 INJECTION INTRAMUSCULAR; INTRAVENOUS at 08:23

## 2025-07-17 RX ADMIN — SODIUM CHLORIDE, SODIUM LACTATE, POTASSIUM CHLORIDE, AND CALCIUM CHLORIDE: .6; .31; .03; .02 INJECTION, SOLUTION INTRAVENOUS at 06:51

## 2025-07-17 RX ADMIN — PROPOFOL 25 MG: 10 INJECTION, EMULSION INTRAVENOUS at 08:41

## 2025-07-17 RX ADMIN — FENTANYL CITRATE 50 MCG: 50 INJECTION INTRAMUSCULAR; INTRAVENOUS at 08:37

## 2025-07-17 RX ADMIN — MIDAZOLAM HYDROCHLORIDE 2 MG: 1 INJECTION, SOLUTION INTRAMUSCULAR; INTRAVENOUS at 07:22

## 2025-07-17 RX ADMIN — DEXMEDETOMIDINE 4 MCG: 100 INJECTION, SOLUTION INTRAVENOUS at 09:20

## 2025-07-17 RX ADMIN — FENTANYL CITRATE 25 MCG: 0.05 INJECTION, SOLUTION INTRAMUSCULAR; INTRAVENOUS at 10:45

## 2025-07-17 RX ADMIN — ACETAMINOPHEN 1000 MG: 500 TABLET ORAL at 06:32

## 2025-07-17 RX ADMIN — FENTANYL CITRATE 25 MCG: 0.05 INJECTION, SOLUTION INTRAMUSCULAR; INTRAVENOUS at 10:15

## 2025-07-17 RX ADMIN — LIDOCAINE HYDROCHLORIDE 40 MG: 20 INJECTION, SOLUTION EPIDURAL; INFILTRATION; INTRACAUDAL; PERINEURAL at 07:56

## 2025-07-17 RX ADMIN — PROPOFOL 25 MG: 10 INJECTION, EMULSION INTRAVENOUS at 07:57

## 2025-07-17 RX ADMIN — TRANEXAMIC ACID 1000 MG: 100 INJECTION, SOLUTION INTRAVENOUS at 08:20

## 2025-07-17 RX ADMIN — CELECOXIB 200 MG: 200 CAPSULE ORAL at 06:32

## 2025-07-17 RX ADMIN — ONDANSETRON 4 MG: 2 INJECTION, SOLUTION INTRAMUSCULAR; INTRAVENOUS at 12:28

## 2025-07-17 RX ADMIN — HYDROMORPHONE HYDROCHLORIDE 0.5 MG: 1 INJECTION, SOLUTION INTRAMUSCULAR; INTRAVENOUS; SUBCUTANEOUS at 10:07

## 2025-07-17 RX ADMIN — PROPOFOL 25 MG: 10 INJECTION, EMULSION INTRAVENOUS at 08:24

## 2025-07-17 RX ADMIN — BUPIVACAINE HYDROCHLORIDE WITH DEXTROSE 11.25 MG: 7.5 INJECTION SUBARACHNOID at 08:08

## 2025-07-17 RX ADMIN — MEPERIDINE HYDROCHLORIDE 25 MG: 25 INJECTION INTRAMUSCULAR; INTRAVENOUS; SUBCUTANEOUS at 07:57

## 2025-07-17 RX ADMIN — OXYCODONE 5 MG: 5 TABLET ORAL at 10:16

## 2025-07-17 RX ADMIN — WATER 2000 MG: 1 INJECTION INTRAMUSCULAR; INTRAVENOUS; SUBCUTANEOUS at 13:47

## 2025-07-17 RX ADMIN — FENTANYL CITRATE 100 MCG: 0.05 INJECTION, SOLUTION INTRAMUSCULAR; INTRAVENOUS at 07:22

## 2025-07-17 RX ADMIN — MIDAZOLAM 2 MG: 1 INJECTION INTRAMUSCULAR; INTRAVENOUS at 07:48

## 2025-07-17 RX ADMIN — PROPOFOL 50 MCG/KG/MIN: 10 INJECTION, EMULSION INTRAVENOUS at 08:11

## 2025-07-17 ASSESSMENT — PAIN - FUNCTIONAL ASSESSMENT: PAIN_FUNCTIONAL_ASSESSMENT: 0-10

## 2025-07-17 ASSESSMENT — PAIN DESCRIPTION - DESCRIPTORS
DESCRIPTORS: ACHING
DESCRIPTORS: ACHING

## 2025-07-17 ASSESSMENT — PAIN DESCRIPTION - LOCATION: LOCATION: LEG

## 2025-07-17 ASSESSMENT — PAIN SCALES - GENERAL: PAINLEVEL_OUTOF10: 8

## 2025-07-17 NOTE — BRIEF OP NOTE
Brief Postoperative Note      Patient: Brianna Harrison  YOB: 1975  MRN: [id number]    Date of Procedure: 7/17/2025    Pre-Op Diagnosis Codes:      * Osteoarthritis of left knee [M17.12]    Post-Op Diagnosis: Same       Procedure(s):  LEFT TOTAL KNEE ARTHROPLASTY (FAST TRACK) (SPINAL WITH BLOCK)    Surgeon(s):  Jefe Rosas MD    Assistant:  Surgical Assistant: Natalie Jackson  Physician Assistant: Esther Dickerson PA-C    Anesthesia: Spinal    Estimated Blood Loss (mL): 200     Complications: None    Specimens:   * No specimens in log *    Implants:  Implant Name Type Inv. Item Serial No.  Lot No. LRB No. Used Action   INSERT TIB SZ 5 [id number] LT KNEE EMPOWR 3D E + - SN/A  INSERT TIB SZ 5 [id number] LT KNEE EMPOWR 3D E + N/A [employer] [id number] Left 1 Implanted   BASEPLATE TIB 5 KNEE LT POROUS EMPOWR KNEE - SN/A  BASEPLATE TIB 5 KNEE LT POROUS EMPOWR KNEE N/A [employer] [id number] Left 1 Implanted   COMPONENT FEM 5L LT POROUS COAT EMPOWR 3D KNEE - SN/A  COMPONENT FEM 5L LT POROUS COAT EMPOWR 3D KNEE N/A [employer] [id number] Left 1 Implanted         Drains: * No LDAs found *    Findings:  Present At Time Of Surgery (PATOS) (choose all levels that have infection present):  No infection present  Other Findings: oa left knee    Electronically signed by Esther Dickerson PA-C on 7/17/2025 at 9:39 AM

## 2025-07-17 NOTE — ANESTHESIA PROCEDURE NOTES
Spinal Block    Patient location during procedure: OR  End time: 7/17/2025 8:18 AM  Reason for block: primary anesthetic  Staffing  Performed: anesthesiologist   Anesthesiologist: Madi Varma MD  Performed by: Madi Varma MD  Authorized by: Madi Varma MD    Spinal Block  Patient position: sitting  Prep: DuraPrep  Patient monitoring: cardiac monitor, continuous pulse ox, frequent blood pressure checks and oxygen  Approach: midline  Location: L4/L5  Provider prep: mask and sterile gloves  Needle  Needle type: pencil-tip   Needle gauge: 22 G  Needle length: 4 in  Assessment  Sensory level: T4  Events: None  Swirl obtained: Yes  CSF: clear  Attempts: 1  Hemodynamics: stable  Preanesthetic Checklist  Completed: patient identified, IV checked, site marked, risks and benefits discussed, surgical/procedural consents, equipment checked, pre-op evaluation, timeout performed, anesthesia consent given, oxygen available, monitors applied/VS acknowledged and fire risk safety assessment completed and verbalized

## 2025-07-17 NOTE — FLOWSHEET NOTE
07/17/25 0843   Family Communication    Relationship to Patient Spouse    Phone Number Bola Harrison [phone]   Family/Significant Other Update Called   Delivery Origin Nurse   Family Communication   Family Update Message Procedure started

## 2025-07-17 NOTE — DISCHARGE INSTRUCTIONS
Post-op Discharge Instructions Following Total Joint Replacement  Jefe Rosas MD  [facility]  [phone]  Follow-up Office Visit  See Esther Dickerson PA-C approximately 3-4 weeks from date of surgery. Call [phone] to make an appointment.  If you have any postop questions for Dr. Rosas's clinical team, please call Julieta [phone].  Activity  Use your walker for ambulation.  Weight bearing as tolerated unless instructed otherwise by the physical therapist. Get up every hour you are awake and take a brief walk. Lengthen walking distance daily as your strength improves.  Continue using your walker until seen in the office for your first follow up visit.  Practice your exercises 3 times daily as instructed by the physical therapist. Ice for 20 minutes after exercising.  No driving until seen in the office for your first follow up visit.  Incision Care  The surgical dressing is waterproof and is to remain on your incision for 7 days. On the 7th day, carefully lift the edge of the dressing to break the adhesive seal and gently peel it off.  If your surgical dressing comes loose or falls off before the 7th day, replace it with a dry sterile gauze dressing and change this dressing daily. Once there is no drainage on the bandage, you mean leave the incision open to air.  You may take a shower with the surgical dressing in place. After you remove the surgical dressing on day 7, you may continue to shower and get your incision wet in the shower. Do not submerge your incision under water in a bathtub, hot tub, swimming pool, etc. until after you have been evaluated at your first office visit.  You will see a strip of mesh tape applied to your incision.  Leave this mesh tape in place.  As the tape becomes loose from the skin, you may trim the loose edges with clean scissors.  If the tape is still in place 3 weeks after surgery, you may gently remove it while in the shower.  Medications  Blood Clot

## 2025-07-17 NOTE — ANESTHESIA POSTPROCEDURE EVALUATION
Department of Anesthesiology  Postprocedure Note    Patient: Brianna Harrison  MRN: [id number]  YOB: 1975  Date of evaluation: 7/17/2025    Procedure Summary       Date: 07/17/25 Room / Location: [facility] MAIN OR [address] [place] [address] MAIN OR    Anesthesia Start: 0748 Anesthesia Stop: 0958    Procedure: LEFT TOTAL KNEE ARTHROPLASTY (FAST TRACK) (SPINAL WITH BLOCK) (Left: Knee) Diagnosis:       Osteoarthritis of left knee      (Osteoarthritis of left knee [M17.12])    Providers: Jefe Rosas MD Responsible Provider: Madi Varma MD    Anesthesia Type: Spinal, MAC ASA Status: 2            Anesthesia Type: Spinal, MAC    Kierra Phase I: Kierra Score: 10    Kierra Phase II:      Anesthesia Post Evaluation    Patient location during evaluation: PACU  Patient participation: complete - patient participated  Level of consciousness: awake and alert  Airway patency: patent  Nausea & Vomiting: no nausea  Cardiovascular status: hemodynamically stable  Respiratory status: acceptable  Hydration status: stable  Pain management: adequate    No notable events documented.

## 2025-07-17 NOTE — CARE COORDINATION
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 CM sent mass  referral due to limited in network agencies for [employer].     [facility] accepted, JANINE added to AVS.     Carol Faith RN/CRM

## 2025-07-17 NOTE — ANESTHESIA PROCEDURE NOTES
Peripheral Block    Patient location during procedure: pre-op  Reason for block: post-op pain management and at surgeon's request  Start time: 7/17/2025 7:22 AM  End time: 7/17/2025 7:26 AM  Staffing  Performed: anesthesiologist   Anesthesiologist: Madi Varma MD  Performed by: Madi Varma MD  Authorized by: Madi Varma MD    Preanesthetic Checklist  Completed: patient identified, IV checked, site marked, risks and benefits discussed, surgical/procedural consents, equipment checked, pre-op evaluation, timeout performed, anesthesia consent given, oxygen available, monitors applied/VS acknowledged and fire risk safety assessment completed and verbalized  Peripheral Block   Patient position: supine  Prep: ChloraPrep  Provider prep: mask and sterile gloves  Patient monitoring: cardiac monitor, continuous pulse ox, frequent blood pressure checks, IV access and oxygen  Block type: Saphenous  Laterality: left  Injection technique: single-shot  Guidance: ultrasound guided  Infiltration strength: 0.5 %Dose: 30 mL    Needle   Needle type: insulated echogenic nerve stimulator needle   Needle gauge: 21 G  Needle localization: anatomical landmarks and ultrasound guidance  Needle length: 10 cm  Assessment   Injection assessment: negative aspiration for heme, no paresthesia on injection, local visualized surrounding nerve on ultrasound and no intravascular symptoms  Paresthesia pain: none  Slow fractionated injection: yes  Hemodynamics: stable  Outcomes: uncomplicated and patient tolerated procedure well    Medications Administered  ropivacaine (NAROPIN) injection 0.5% - Perineural   30 mL - 7/17/2025 7:22:00 AM

## 2025-07-17 NOTE — ANESTHESIA PRE PROCEDURE
Department of Anesthesiology  Preprocedure Note       Name:  Brianna Harrison   Age:  50 y.o.  :  1975                                          MRN:  [id number]         Date:  2025      Surgeon: Surgeon(s):  Jefe Rosas MD    Procedure: Procedure(s):  LEFT TOTAL KNEE ARTHROPLASTY (FAST TRACK) (SPINAL WITH BLOCK)    Medications prior to admission:   Prior to Admission medications    Medication Sig Start Date End Date Taking? Authorizing Provider   metFORMIN (GLUCOPHAGE) 500 MG tablet Take 1 tablet by mouth 3 times daily (with meals)    Automatic Reconciliation, Ar       Current medications:    Current Facility-Administered Medications   Medication Dose Route Frequency Provider Last Rate Last Admin    tranexamic acid (CYKLOKAPRON) 1,000 mg in sodium chloride 0.9 % 110 mL IVPB (addEASE)  1,000 mg IntraVENous On Call to OR Esther Dickerson PA-C        sodium chloride flush 0.9 % injection 5-40 mL  5-40 mL IntraVENous 2 times per day Esther Dickerson PA-C        sodium chloride flush 0.9 % injection 5-40 mL  5-40 mL IntraVENous PRN Esther Dickerson PA-C        0.9 % sodium chloride infusion   IntraVENous PRN Esther Dickerson PA-C        ceFAZolin (ANCEF) 2,000 mg in sterile water 20 mL IV syringe  2,000 mg IntraVENous On Call to OR Esther Dickerson PA-C        lidocaine PF 1 % injection 1 mL  1 mL IntraDERmal Once PRN Madi Varma MD        fentaNYL (SUBLIMAZE) injection 100 mcg  100 mcg IntraVENous Once PRN Madi Varma MD        lactated ringers infusion   IntraVENous Continuous Madi Varma  mL/hr at 25 0651 New Bag at 25 0651    sodium chloride flush 0.9 % injection 5-40 mL  5-40 mL IntraVENous 2 times per day Madi Varma MD        sodium chloride flush 0.9 % injection 5-40 mL  5-40 mL IntraVENous PRN Madi Varma MD        midazolam PF (VERSED) IntraVENous 2 mg  2 mg IntraVENous PRN Madi Varma MD        ROPivacaine (NAROPIN) 0.5%

## 2025-07-17 NOTE — PROGRESS NOTES
6/18/2025 patient completed online education. no quesitons at this time.     KNEE DISABILITY OSTEOARTHRITIS AND OUTCOME SCORE    Stiffness - The following question concerns the amount of joing stiffness you have experienced during the last week in your knee. Stiffness is a sensation of restriction or slowness in the ease with which you move your knee joint.  How severe is your knee stiffness after first wakening in the morning?: (Patient-Rptd) 2        Pain - What amount of knee pain have you experienced the last week during the following activities?  Twisting/pivoting on your knee: (Patient-Rptd) 3  Straightening knee fully: (Patient-Rptd) 2  Going up or down stairs: (Patient-Rptd) 3  Standing upright: (Patient-Rptd) 2        Function - Please indicate the degree of difficulty you have experienced in the last week due to your knee.  Rising from sitting: (Patient-Rptd) 2  Bending to floor/ an object: (Patient-Rptd) 3        Raw Score  Jr LUIS. Knee Survey Score: (Patient-Rptd) 17  KOOS JR Total Interval Score (0-100 Scale): (Patient-Rptd) 44.905      PROMIS Questions    In general, would you say your health is:: (Patient-Rptd) 3    In general, would you say your quality of life is:: (Patient-Rptd) 2    In general, how would you rate your physical health?: (Patient-Rptd) 2    In general, how would you rate your mental health, including your mood and your ability to think?: (Patient-Rptd) 4    To what extent are you able to carry out your everyday physical activities such as walking, climbing stairs, carrying groceries, or moving a chair?: (Patient-Rptd) 4    In the past 7 days how often have you been bothered by emotional problems such as feeling anxious, depressed or irritable?: (Patient-Rptd) 4    In the past 7 days how would you rate your fatigue on average?: (Patient-Rptd) 4    In the past 7 days how would you rate your pain on average?: (Patient-Rptd) 8    In general, please rate how well you carry out 
Raymundo, PT  Minutes: 45      Physical Therapy Evaluation Charge Determination   History Examination Presentation Decision-Making   LOW Complexity : Zero comorbidities / personal factors that will impact the outcome / POC LOW Complexity : 1-2 Standardized tests and measures addressing body structure, function, activity limitation and / or participation in recreation  LOW Complexity : Stable, uncomplicated  AM-PAC  LOW      Based on the above components, the patient evaluation is determined to be of the following complexity level: Low

## 2025-07-18 NOTE — OP NOTE
[address]  [address]                            OPERATIVE REPORT      PATIENT NAME: KATHY MARY              : 1975  MED REC NO: [id number]                       ROOM: OR  ACCOUNT NO: [id number]                       ADMIT DATE: 2025  PROVIDER: Jefe Rosas MD    DATE OF SERVICE:  2025    PREOPERATIVE DIAGNOSES:  Osteoarthritis, left knee.    POSTOPERATIVE DIAGNOSES:  Osteoarthritis, left knee.    PROCEDURES PERFORMED:  Left total knee arthroplasty.    SURGEON:  Jefe Rsoas MD    ASSISTANT:  First assistant, Esther Dickerson PA-C    ANESTHESIA:  Spinal sedation as well as adductor canal block.    ESTIMATED BLOOD LOSS:  200 mL.    SPECIMENS REMOVED:  None.     COMPLICATIONS:  None.    IMPLANTS:  [employer] 3D size 5 cementless femur, size 5 cementless tibial tray with 10 mm polyethylene insert.    INDICATIONS:  The patient is a 50-year-old female with progressive left knee pain due to severe tricompartmental osteoarthritis.  Symptoms have progressed despite comprehensive conservative treatment.  She presents for left total knee replacement.  Risks, benefits, alternatives of procedure were reviewed in detail.  She desires to proceed.    DESCRIPTION OF PROCEDURE:  Anesthesia team performed an adductor canal block in the left thigh before taking the patient to the operating room and they also placed a spinal.  Preoperative IV antibiotics were administered.  Padded pneumatic tourniquet was placed around left upper thigh.  Left lower extremity was prepped and draped in usual sterile fashion.  Tourniquet was inflated to 300.  Through midline anterior knee incision, I performed a medial parapatellar arthrotomy.  Progressive medial release was performed to facilitate exposure and soft tissue balance throughout the procedure.  As soon as the knee was flexed, the tourniquet was released.  10 mm distal femoral resection was

## (undated) DEVICE — CUSTOM CAST PD STR

## (undated) DEVICE — PADDING CAST W6INXL4YD NONSTERILE COT RAYON MICROPLEATED

## (undated) DEVICE — 4-PORT MANIFOLD: Brand: NEPTUNE 2

## (undated) DEVICE — STRYKER PERFORMANCE SERIES SAGITTAL BLADE: Brand: STRYKER PERFORMANCE SERIES

## (undated) DEVICE — GLOVE SURG SZ 65 L12IN FNGR THK79MIL GRN LTX FREE

## (undated) DEVICE — SUTURE VICRYL 1 L27IN ABSRB CT BRAID COAT UD J281H

## (undated) DEVICE — SUTURE VICRYL ABSORBABLE BRAIDED 2-0 CT 36 IN DA UD  VCP957H

## (undated) DEVICE — BANDAGE COMPR M W6INXL10YD WHT BGE VELC E MTRX HK AND LOOP

## (undated) DEVICE — TIP SUCT CRV REG REDI

## (undated) DEVICE — SYSTEM NAVIGATION PALM SZ PRECIS ALIGN TECHNOLOGY DISP FOR

## (undated) DEVICE — X-RAY DETECTABLE SPONGES,16 PLY: Brand: VISTEC

## (undated) DEVICE — SYRINGE 20ML LL S/C 50

## (undated) DEVICE — ZIPPERED TOGA, X-LARGE: Brand: FLYTE, SURGICOOL

## (undated) DEVICE — PIN

## (undated) DEVICE — SOLUTION SURG PREP 26 CC PURPREP

## (undated) DEVICE — SUTURE VICRYL SZ 0 L27IN ABSRB UD L36MM CT-1 1/2 CIR J260H

## (undated) DEVICE — GAUZE,SPONGE,4"X4",12PLY,STRL,LF,10/TRAY: Brand: MEDLINE

## (undated) DEVICE — NEEDLE HYPO 21GA L1IN GRN S STL HUB POLYPR SHLD REG BVL

## (undated) DEVICE — SUTURE MONOCRYL + SZ 3-0 L27IN ABSRB UD PS1 L24MM 3/8 CIR REV MCP936H

## (undated) DEVICE — APPLICATOR MEDICATED 26 CC SOLUTION HI LT ORNG CHLORAPREP

## (undated) DEVICE — TOTAL JOINT - SMH: Brand: MEDLINE INDUSTRIES, INC.

## (undated) DEVICE — SCRUBIN SCRUB BRUSH DRY STER: Brand: MEDLINE INDUSTRIES, INC.

## (undated) DEVICE — SYRINGE MED 10ML LUERLOCK TIP W/O SFTY DISP

## (undated) DEVICE — CONTAINER,SPECIMEN,4OZ,OR STRL: Brand: MEDLINE

## (undated) DEVICE — ELECTRODE PT RET AD L9FT HI MOIST COND ADH HYDRGEL CORDED

## (undated) DEVICE — ZIPPERED TOGA, 2X LARGE: Brand: FLYTE, SURGICOOL

## (undated) DEVICE — STERILE POLYISOPRENE POWDER-FREE SURGICAL GLOVES WITH EMOLLIENT COATING: Brand: PROTEXIS

## (undated) DEVICE — ZIMMER® STERILE DISPOSABLE TOURNIQUET CUFF WITH PLC, DUAL PORT, SINGLE BLADDER, 34 IN. (86 CM)

## (undated) DEVICE — SUTURE ABSORBABLE MONOFILAMENT 1 CTX 36 CM 48 MM VIO PDS +

## (undated) DEVICE — Device

## (undated) DEVICE — HANDPIECE SET WITH BONE CLEANING TIP AND SUCTION TUBE: Brand: INTERPULSE

## (undated) DEVICE — DRESSING FOAM POST OPERATIVE 4X10 IN MEPILEX BORDER AG

## (undated) DEVICE — SHEET,DRAPE,53X77,STERILE: Brand: MEDLINE

## (undated) DEVICE — SOLUTION PULSED LAVAGE XPERIENCE 500ML NO-RINSE

## (undated) DEVICE — INTENT OT USE PROVIDES A STERILE INTERFACE BETWEEN THE OPERATING ROOM SURGICAL LAMPS (NON-STERILE) AND THE SURGEON OR STAFF WORKING IN THE STERILE FIELD.: Brand: ASPEN® ALC PLUS LIGHT HANDLE COVER

## (undated) DEVICE — YANKAUER,FLEXIBLE HANDLE,REGLR CAPACITY: Brand: MEDLINE INDUSTRIES, INC.

## (undated) DEVICE — GLOVE SURG SZ 65 L12IN FNGR THK94MIL STD WHT LTX FREE

## (undated) DEVICE — MARKER,SKIN,WI/RULER AND LABELS: Brand: MEDLINE

## (undated) DEVICE — STERILE POLYISOPRENE POWDER-FREE SURGICAL GLOVES: Brand: PROTEXIS